# Patient Record
Sex: FEMALE | Race: WHITE | NOT HISPANIC OR LATINO | Employment: UNEMPLOYED | URBAN - METROPOLITAN AREA
[De-identification: names, ages, dates, MRNs, and addresses within clinical notes are randomized per-mention and may not be internally consistent; named-entity substitution may affect disease eponyms.]

---

## 2019-01-01 ENCOUNTER — APPOINTMENT (EMERGENCY)
Dept: RADIOLOGY | Facility: HOSPITAL | Age: 64
End: 2019-01-01
Payer: COMMERCIAL

## 2019-01-01 ENCOUNTER — HOSPITAL ENCOUNTER (EMERGENCY)
Facility: HOSPITAL | Age: 64
Discharge: HOME | End: 2019-01-14
Attending: EMERGENCY MEDICINE
Payer: COMMERCIAL

## 2019-01-01 ENCOUNTER — HOSPITAL ENCOUNTER (EMERGENCY)
Facility: HOSPITAL | Age: 64
End: 2019-01-29
Attending: EMERGENCY MEDICINE
Payer: COMMERCIAL

## 2019-01-01 VITALS
TEMPERATURE: 98.1 F | RESPIRATION RATE: 24 BRPM | HEART RATE: 96 BPM | DIASTOLIC BLOOD PRESSURE: 110 MMHG | OXYGEN SATURATION: 98 % | SYSTOLIC BLOOD PRESSURE: 164 MMHG | WEIGHT: 90.56 LBS

## 2019-01-01 DIAGNOSIS — I46.9 CARDIAC ARREST (HCC): Primary | ICD-10-CM

## 2019-01-01 DIAGNOSIS — J44.9 CHRONIC OBSTRUCTIVE PULMONARY DISEASE, UNSPECIFIED COPD TYPE (HCC): Primary | ICD-10-CM

## 2019-01-01 DIAGNOSIS — E87.6 HYPOKALEMIA: ICD-10-CM

## 2019-01-01 LAB
ANION GAP SERPL CALCULATED.3IONS-SCNC: 8 MMOL/L (ref 5–14)
ATRIAL RATE: 83 BPM
BACTERIA BLD CULT: NORMAL
BASOPHILS # BLD AUTO: 0.1 THOUSANDS/ΜL (ref 0–0.1)
BASOPHILS NFR BLD AUTO: 2 % (ref 0–1)
BUN SERPL-MCNC: 14 MG/DL (ref 5–25)
CA-I BLD-SCNC: 0.69 MMOL/L (ref 1.12–1.32)
CALCIUM SERPL-MCNC: 9.6 MG/DL (ref 8.4–10.2)
CHLORIDE SERPL-SCNC: 101 MMOL/L (ref 97–108)
CO2 SERPL-SCNC: 33 MMOL/L (ref 22–30)
CREAT SERPL-MCNC: 0.71 MG/DL (ref 0.6–1.2)
EOSINOPHIL # BLD AUTO: 0.1 THOUSAND/ΜL (ref 0–0.4)
EOSINOPHIL NFR BLD AUTO: 1 % (ref 0–6)
ERYTHROCYTE [DISTWIDTH] IN BLOOD BY AUTOMATED COUNT: 14.1 %
FLUAV + FLUBV RNA ISLT NAA+PROBE: NOT DETECTED
FLUAV + FLUBV RNA ISLT NAA+PROBE: NOT DETECTED
GFR SERPL CREATININE-BSD FRML MDRD: 104 ML/MIN/1.73SQ M
GLUCOSE SERPL-MCNC: 107 MG/DL (ref 70–99)
GLUCOSE SERPL-MCNC: 310 MG/DL (ref 65–140)
HCT VFR BLD AUTO: 48.6 % (ref 36–46)
HGB BLD-MCNC: 16 G/DL (ref 12–16)
LACTATE SERPL-SCNC: 1.2 MMOL/L (ref 0.7–2)
LYMPHOCYTES # BLD AUTO: 3.3 THOUSANDS/ΜL (ref 0.5–4)
LYMPHOCYTES NFR BLD AUTO: 39 % (ref 25–45)
MCH RBC QN AUTO: 29.9 PG (ref 26–34)
MCHC RBC AUTO-ENTMCNC: 32.9 G/DL (ref 31–36)
MCV RBC AUTO: 91 FL (ref 80–100)
MONOCYTES # BLD AUTO: 0.7 THOUSAND/ΜL (ref 0.2–0.9)
MONOCYTES NFR BLD AUTO: 8 % (ref 1–10)
NEUTROPHILS # BLD AUTO: 4.2 THOUSANDS/ΜL (ref 1.8–7.8)
NEUTS SEG NFR BLD AUTO: 50 % (ref 45–65)
P AXIS: 85 DEGREES
PLATELET # BLD AUTO: 286 THOUSANDS/UL (ref 150–450)
PMV BLD AUTO: 9.1 FL (ref 8.9–12.7)
PO2 BLD: 17 MM HG (ref 35–45)
POTASSIUM SERPL-SCNC: 2.9 MMOL/L (ref 3.6–5)
PR INTERVAL: 144 MS
QRS AXIS: 24 DEGREES
QRSD INTERVAL: 78 MS
QT INTERVAL: 330 MS
QTC INTERVAL: 387 MS
RBC # BLD AUTO: 5.36 MILLION/UL (ref 4–5.2)
SODIUM BLD-SCNC: >175 MMOL/L (ref 136–145)
SODIUM SERPL-SCNC: 142 MMOL/L (ref 137–147)
SPECIMEN SOURCE: ABNORMAL
T WAVE AXIS: 32 DEGREES
TROPONIN I SERPL-MCNC: 0.02 NG/ML (ref 0–0.03)
VENTRICULAR RATE: 83 BPM
WBC # BLD AUTO: 8.4 THOUSAND/UL (ref 4.5–11)

## 2019-01-01 PROCEDURE — 85025 COMPLETE CBC W/AUTO DIFF WBC: CPT | Performed by: EMERGENCY MEDICINE

## 2019-01-01 PROCEDURE — 96375 TX/PRO/DX INJ NEW DRUG ADDON: CPT

## 2019-01-01 PROCEDURE — 36415 COLL VENOUS BLD VENIPUNCTURE: CPT | Performed by: EMERGENCY MEDICINE

## 2019-01-01 PROCEDURE — 80048 BASIC METABOLIC PNL TOTAL CA: CPT | Performed by: EMERGENCY MEDICINE

## 2019-01-01 PROCEDURE — 82330 ASSAY OF CALCIUM: CPT

## 2019-01-01 PROCEDURE — 84132 ASSAY OF SERUM POTASSIUM: CPT

## 2019-01-01 PROCEDURE — 99284 EMERGENCY DEPT VISIT MOD MDM: CPT

## 2019-01-01 PROCEDURE — 99285 EMERGENCY DEPT VISIT HI MDM: CPT

## 2019-01-01 PROCEDURE — 96360 HYDRATION IV INFUSION INIT: CPT

## 2019-01-01 PROCEDURE — 93005 ELECTROCARDIOGRAM TRACING: CPT

## 2019-01-01 PROCEDURE — 85014 HEMATOCRIT: CPT

## 2019-01-01 PROCEDURE — 84295 ASSAY OF SERUM SODIUM: CPT

## 2019-01-01 PROCEDURE — 84484 ASSAY OF TROPONIN QUANT: CPT | Performed by: EMERGENCY MEDICINE

## 2019-01-01 PROCEDURE — 87040 BLOOD CULTURE FOR BACTERIA: CPT | Performed by: EMERGENCY MEDICINE

## 2019-01-01 PROCEDURE — 93010 ELECTROCARDIOGRAM REPORT: CPT | Performed by: INTERNAL MEDICINE

## 2019-01-01 PROCEDURE — 82947 ASSAY GLUCOSE BLOOD QUANT: CPT

## 2019-01-01 PROCEDURE — 96374 THER/PROPH/DIAG INJ IV PUSH: CPT

## 2019-01-01 PROCEDURE — 82803 BLOOD GASES ANY COMBINATION: CPT

## 2019-01-01 PROCEDURE — 71045 X-RAY EXAM CHEST 1 VIEW: CPT

## 2019-01-01 PROCEDURE — 83605 ASSAY OF LACTIC ACID: CPT | Performed by: EMERGENCY MEDICINE

## 2019-01-01 PROCEDURE — 87502 INFLUENZA DNA AMP PROBE: CPT | Performed by: EMERGENCY MEDICINE

## 2019-01-01 RX ORDER — POTASSIUM CHLORIDE 750 MG/1
40 TABLET, EXTENDED RELEASE ORAL ONCE
Status: COMPLETED | OUTPATIENT
Start: 2019-01-01 | End: 2019-01-01

## 2019-01-01 RX ORDER — AZITHROMYCIN 250 MG/1
TABLET, FILM COATED ORAL
Qty: 6 TABLET | Refills: 0 | Status: SHIPPED | OUTPATIENT
Start: 2019-01-01 | End: 2019-01-01

## 2019-01-01 RX ORDER — CALCIUM CHLORIDE 100 MG/ML
SYRINGE (ML) INTRAVENOUS CODE/TRAUMA/SEDATION MEDICATION
Status: COMPLETED | OUTPATIENT
Start: 2019-01-01 | End: 2019-01-01

## 2019-01-01 RX ORDER — ACETAMINOPHEN 325 MG/1
1000 TABLET ORAL ONCE
Status: COMPLETED | OUTPATIENT
Start: 2019-01-01 | End: 2019-01-01

## 2019-01-01 RX ORDER — ALBUTEROL SULFATE 90 UG/1
2 AEROSOL, METERED RESPIRATORY (INHALATION) EVERY 4 HOURS PRN
Qty: 1 INHALER | Refills: 0 | Status: SHIPPED | OUTPATIENT
Start: 2019-01-01

## 2019-01-01 RX ORDER — ALBUTEROL SULFATE 90 UG/1
2 AEROSOL, METERED RESPIRATORY (INHALATION) EVERY 4 HOURS PRN
Qty: 1 INHALER | Refills: 0 | Status: SHIPPED | OUTPATIENT
Start: 2019-01-01 | End: 2019-01-01

## 2019-01-01 RX ORDER — SODIUM BICARBONATE 84 MG/ML
INJECTION, SOLUTION INTRAVENOUS CODE/TRAUMA/SEDATION MEDICATION
Status: COMPLETED | OUTPATIENT
Start: 2019-01-01 | End: 2019-01-01

## 2019-01-01 RX ADMIN — SODIUM CHLORIDE 1000 ML: 9 INJECTION, SOLUTION INTRAVENOUS at 14:25

## 2019-01-01 RX ADMIN — ACETAMINOPHEN 975 MG: 325 TABLET ORAL at 13:52

## 2019-01-01 RX ADMIN — POTASSIUM CHLORIDE 40 MEQ: 10 TABLET, EXTENDED RELEASE ORAL at 15:04

## 2019-01-01 RX ADMIN — EPINEPHRINE 1 MG: 0.1 INJECTION INTRACARDIAC; INTRAVENOUS at 12:43

## 2019-01-01 RX ADMIN — CALCIUM CHLORIDE 1 G: 100 INJECTION, SOLUTION INTRAVENOUS at 12:45

## 2019-01-01 RX ADMIN — EPINEPHRINE 1 MG: 0.1 INJECTION INTRACARDIAC; INTRAVENOUS at 12:40

## 2019-01-01 RX ADMIN — SODIUM BICARBONATE 50 MEQ: 84 INJECTION, SOLUTION INTRAVENOUS at 12:42

## 2019-01-14 NOTE — ED PROCEDURE NOTE
PROCEDURE  ECG 12 Lead Documentation  Date/Time: 1/14/2019 1:34 PM  Performed by: Liza Fields  Authorized by: Liza Fields     Indications / Diagnosis:  Flu like illness  ECG reviewed by me, the ED Provider: yes    Patient location:  ED  Previous ECG:     Comparison to cardiac monitor: Yes    Interpretation:     Interpretation: normal    Rate:     ECG rate:  83    ECG rate assessment: normal    Rhythm:     Rhythm: sinus rhythm    Ectopy:     Ectopy: none    QRS:     QRS axis:  Normal    QRS intervals:  Normal  Conduction:     Conduction: normal    ST segments:     ST segments:  Normal  T waves:     T waves: normal           Katheryn Marie MD  01/14/19 5831

## 2019-01-14 NOTE — DISCHARGE INSTRUCTIONS
COPD (Chronic Obstructive Pulmonary Disease)   WHAT YOU NEED TO KNOW:   Chronic obstructive pulmonary disease (COPD) is a lung disease that makes it hard for you to breathe  It is usually a result of lung damage caused by years of irritation and inflammation in your lungs  DISCHARGE INSTRUCTIONS:   Call 911 if:   · You feel lightheaded, short of breath, and have chest pain  Return to the emergency department if:   · You are confused, dizzy, or feel faint  · Your arm or leg feels warm, tender, and painful  It may look swollen and red  · You cough up blood  Contact your healthcare provider if:   · You have more shortness of breath than usual      · You need more medicine than usual to control your symptoms  · You are coughing or wheezing more than usual      · You are coughing up more mucus, or it is a different color or has a different odor  · You gain more than 3 pounds in a week  · You have a fever, a runny or stuffy nose, and a sore throat, or other cold or flu symptoms  · Your skin, lips, or nails start to turn blue  · You have swelling in your legs or ankles  · You are very tired or weak for more than a day  · You notice changes in your mood, or changes in your ability to think or concentrate  · You have questions or concerns about your condition or care  Medicines:   · Medicines  may be used to open your airways, decrease swelling and inflammation in your lungs, or treat an infection  You may need 2 or more medicines  A short-acting medicine relieves symptoms quickly  Long-acting medicines will control or prevent symptoms  Ask for more information about the medicines you are given and how to use them safely  · Take your medicine as directed  Contact your healthcare provider if you think your medicine is not helping or if you have side effects  Tell him or her if you are allergic to any medicine  Keep a list of the medicines, vitamins, and herbs you take  Include the amounts, and when and why you take them  Bring the list or the pill bottles to follow-up visits  Carry your medicine list with you in case of an emergency  Help make breathing easier:   · Use pursed-lip breathing any time you feel short of breath  Take a deep breath in through your nose  Slowly breathe out through your mouth with your lips pursed for twice as long as you inhaled  You can also practice this breathing pattern while you bend, lift, climb stairs, or exercise  It slows down your breathing and helps move more air in and out of your lungs  · Do not smoke, and avoid others who smoke  Nicotine and other substances can cause lung irritation or damage and make it harder for you to breathe  Do not use e-cigarettes or smokeless tobacco  They still contain nicotine  Ask your healthcare provider for information if you currently smoke and need help to quit  For support and more information:  ¨ [employer]  Phone: 3- 581 - 962-0474  Web Address: [employer]      · Be aware of and avoid anything that makes your symptoms worse  Stay out of high altitudes and places with high humidity  Stay inside, or cover your mouth and nose with a scarf when you are outside during cold weather  Stay inside on days when air pollution or pollen counts are high  Do not use aerosol sprays such as deodorant, bug spray, and hair spray  Manage COPD and help prevent exacerbations:  COPD is a serious condition that gets worse over time  A COPD exacerbation means your symptoms suddenly get worse  It is important to prevent exacerbations  An exacerbation can cause more lung damage  COPD cannot be cured, but you can take action to feel better and prevent COPD exacerbations:  · Protect yourself from germs  Germs can get into your lungs and cause an infection  An infection in your lungs can create more mucus and make it harder to breathe   An infection can also create swelling in your airways and prevent air from getting in  You can decrease your risk for infection by doing the following:     [facility] [place] your hands often with soap and water  Carry germ-killing gel with you  You can use the gel to clean your hands when soap and water are not available  ¨ Do not touch your eyes, nose, or mouth unless you have washed your hands first      ¨ Always cover your mouth when you cough  Cough into a tissue or your shirtsleeve so you do not spread germs from your hands  ¨ Try to avoid people who have a cold or the flu  If you are sick, stay away from others as much as possible  · Drink more liquids  This will help to keep your air passages moist and help you cough up mucus  Ask how much liquid to drink each day and which liquids are best for you  · Exercise daily  Exercise for at least 20 minutes each day to help increase your energy and decrease shortness of breath  Walking or riding a bike are good ways to exercise  Talk to your healthcare provider about the best exercise plan for you  · Ask about vaccines  Your healthcare provider may recommend that you get regular flu and pneumonia vaccines  Pneumonia can become life-threatening for a person who has COPD  Ask about other vaccines you may need  Ask your healthcare provider about the flu and pneumonia vaccines  All adults should get the flu (influenza) vaccine every year as soon as it becomes available  The pneumonia vaccine is given to adults aged 72 or older to prevent pneumococcal disease, such as pneumonia  Adults aged 23 to 59 years who are at high risk for pneumococcal disease also should get the pneumococcal vaccine  It may need to be repeated 1 or 5 years later  Pulmonary rehabilitation:  Your healthcare provider may recommend a program to help you manage your symptoms and improve your quality of life  It may include nutritional counseling and exercise to strengthen your lungs     Make decisions about your choices for future treatment:  Ask for information about advanced medical directives and living celis  These documents help you decide and write down your choices for treatment and end-of-life care  It is best to complete them when you feel well and can think clearly about your wishes  The information can then be kept for future use if you are in the hospital or become very ill  Follow up with your healthcare provider as directed: You may need more tests  Your healthcare provider may refer you to a pulmonary (lung) specialist  Write down your questions so you remember to ask them during your visits  © 2017 [facility] [place] Information is for End User's use only and may not be sold, redistributed or otherwise used for commercial purposes  All illustrations and images included in CareNotes® are the copyrighted property of A D A M , Inc  or [employer]  The above information is an  only  It is not intended as medical advice for individual conditions or treatments  Talk to your doctor, nurse or pharmacist before following any medical regimen to see if it is safe and effective for you

## 2019-01-14 NOTE — ED PROVIDER NOTES
History  Chief Complaint   Patient presents with   Chinedu Grooms Like Symptoms     Patient is a 69-year-old female with history of asthma high blood pressure who presents with a 1 day history of flu-like symptoms  Patient states that she was run her grandson who was recently getting over the flu yesterday  Woke up today with nasal congestion sore throat positive productive yellow cough as well as chills and myalgias  Patient not taking medication for it  Does not have a regular doctor has not seen a regular doctor in over 3 years  Patient still smokes half a pack a day  Nothing makes better or worse  Did not get the flu shot this year  None       Past Medical History:   Diagnosis Date    Asthma     Hypertension        Past Surgical History:   Procedure Laterality Date    BACK SURGERY         History reviewed  No pertinent family history  I have reviewed and agree with the history as documented  Social History   Substance Use Topics    Smoking status: Current Every Day Smoker    Smokeless tobacco: Never Used    Alcohol use No        Review of Systems   Constitutional: Positive for chills  Negative for activity change, appetite change and fever  HENT: Positive for sore throat  Eyes: Negative  Respiratory: Positive for cough  Negative for shortness of breath  Cardiovascular: Negative  Negative for chest pain  Gastrointestinal: Negative  Negative for abdominal pain, diarrhea, nausea and vomiting  Endocrine: Negative  Genitourinary: Negative  Musculoskeletal: Positive for myalgias  Skin: Negative  Allergic/Immunologic: Negative  Neurological: Negative  Hematological: Negative  Psychiatric/Behavioral: Negative  All other systems reviewed and are negative  Physical Exam  Physical Exam   Constitutional: She is oriented to person, place, and time  She appears well-developed and well-nourished  Patient smells of tobacco    HENT:   Head: Normocephalic     Right Ear: External ear normal    Left Ear: External ear normal    Mouth/Throat: Oropharynx is clear and moist    Multiple missing teeth  Eyes: Pupils are equal, round, and reactive to light  Conjunctivae are normal    Neck: Normal range of motion  Neck supple  Cardiovascular: Normal rate, regular rhythm, normal heart sounds and intact distal pulses  Pulmonary/Chest: Effort normal and breath sounds normal  No respiratory distress  She has no wheezes  She has no rales  She exhibits no tenderness  Abdominal: Soft  Bowel sounds are normal    Musculoskeletal: Normal range of motion  Lymphadenopathy:     She has cervical adenopathy  Neurological: She is alert and oriented to person, place, and time  Skin: Skin is warm and dry  Capillary refill takes less than 2 seconds  Psychiatric: She has a normal mood and affect  Her behavior is normal    Nursing note and vitals reviewed        Vital Signs  ED Triage Vitals [01/14/19 1314]   Temperature Pulse Respirations Blood Pressure SpO2   98 1 °F (36 7 °C) 96 (!) 24 (!) 164/110 98 %      Temp Source Heart Rate Source Patient Position - Orthostatic VS BP Location FiO2 (%)   Tympanic Monitor Sitting Left arm --      Pain Score       7           Vitals:    01/14/19 1314   BP: (!) 164/110   Pulse: 96   Patient Position - Orthostatic VS: Sitting       Visual Acuity      ED Medications  Medications   acetaminophen (TYLENOL) tablet 975 mg (975 mg Oral Given 1/14/19 1352)   sodium chloride 0 9 % bolus 1,000 mL (0 mL Intravenous Stopped 1/14/19 1505)   potassium chloride (K-DUR,KLOR-CON) CR tablet 40 mEq (40 mEq Oral Given 1/14/19 1504)       Diagnostic Studies  Results Reviewed     Procedure Component Value Units Date/Time    Troponin I [[id number]]  (Normal) Collected:  01/14/19 1415    Lab Status:  Final result Specimen:  Blood from Arm, Right Updated:  01/14/19 1455     Troponin I 0 02 ng/mL     Basic metabolic panel [[id number]]  (Abnormal) Collected:  01/14/19 1415    Lab Status:  Final result Specimen:  Blood from Arm, Right Updated:  01/14/19 1443     Sodium 142 mmol/L      Potassium 2 9 (L) mmol/L      Chloride 101 mmol/L      CO2 33 (H) mmol/L      ANION GAP 8 mmol/L      BUN 14 mg/dL      Creatinine 0 71 mg/dL      Glucose 107 (H) mg/dL      Calcium 9 6 mg/dL      eGFR 104 ml/min/1 73sq m     Narrative:         National Kidney Disease Education Program recommendations are as follows:  GFR calculation is accurate only with a steady state creatinine  Chronic Kidney disease less than 60 ml/min/1 73 sq  meters  Kidney failure less than 15 ml/min/1 73 sq  meters  Lactic acid, plasma [[id number]]  (Normal) Collected:  01/14/19 1415    Lab Status:  Final result Specimen:  Blood from Arm, Right Updated:  01/14/19 1442     LACTIC ACID 1 2 mmol/L     Narrative:         Result may be elevated if tourniquet was used during collection  CBC and differential [[id number]]  (Abnormal) Collected:  01/14/19 1415    Lab Status:  Final result Specimen:  Blood from Arm, Right Updated:  01/14/19 1434     WBC 8 40 Thousand/uL      RBC 5 36 (H) Million/uL      Hemoglobin 16 0 g/dL      Hematocrit 48 6 (H) %      MCV 91 fL      MCH 29 9 pg      MCHC 32 9 g/dL      RDW 14 1 %      MPV 9 1 fL      Platelets 723 Thousands/uL      Neutrophils Relative 50 %      Lymphocytes Relative 39 %      Monocytes Relative 8 %      Eosinophils Relative 1 %      Basophils Relative 2 (H) %      Neutrophils Absolute 4 20 Thousands/µL      Lymphocytes Absolute 3 30 Thousands/µL      Monocytes Absolute 0 70 Thousand/µL      Eosinophils Absolute 0 10 Thousand/µL      Basophils Absolute 0 10 Thousands/µL     Blood culture [[id number]] Collected:  01/14/19 1415    Lab Status:   In process Specimen:  Blood from Arm, Right Updated:  01/14/19 1421    Rapid Flu-Viral RNA amplification [facility] HEART ONLY) [[id number]]  (Normal) Collected:  01/14/19 1345    Lab Status:  Final result Specimen:  Nares from Nose Updated:  01/14/19 8792 INFLUENZA A AMPLIFIED RNA Not Detected     INFLUENZA B AMPLIFIED Not Detected    Blood culture [[id number]]     Lab Status:  No result Specimen:  Blood                  XR chest portable   ED Interpretation by Ming Panda MD (01/14 0031)   Hyperinflation, no infiltration  Procedures  Procedures       Phone Contacts  ED Phone Contact    ED Course  ED Course as of Jan 14 1519   Mon Jan 14, 2019   1458 Patient feeling improved 1 over x-ray blood work results  Will discharge with antibiotics inhaler  Stressed follow up with [facility] family Practice return to the ER for any concerns  MDM  Number of Diagnoses or Management Options  Chronic obstructive pulmonary disease, unspecified COPD type ([facility] ): Hypokalemia:      Amount and/or Complexity of Data Reviewed  Clinical lab tests: ordered and reviewed  Tests in the radiology section of CPT®: ordered and reviewed  Tests in the medicine section of CPT®: reviewed and ordered  Independent visualization of images, tracings, or specimens: yes      CritCare Time    Disposition  Final diagnoses:   Chronic obstructive pulmonary disease, unspecified COPD type ([facility] )   Hypokalemia     Time reflects when diagnosis was documented in both MDM as applicable and the Disposition within this note     Time User Action Codes Description Comment    1/14/2019  2:59 PM Al Hein Add [J44 9] Chronic obstructive pulmonary disease, unspecified COPD type ([facility] )     1/14/2019  2:59 PM Al Hein Add [E87 6] Hypokalemia       ED Disposition     ED Disposition Condition Comment    Discharge  Raymon Hussein discharge to home/self care      Condition at discharge: Stable        Follow-up Information     Follow up With Specialties Details Why Dale   [facility]  [address]  [phone]            Discharge Medication List as of 1/14/2019  3:00 PM      START taking these medications    Details   albuterol (PROVENTIL HFA,VENTOLIN HFA) 90 mcg/act inhaler Inhale 2 puffs every 4 (four) hours as needed for wheezing, Starting Mon 1/14/2019, Print      azithromycin (ZITHROMAX) 250 mg tablet Take 2 tablets today then 1 tablet daily x 4 days, Print           No discharge procedures on file      ED Provider  Electronically Signed by           Christal Hester MD  01/14/19 7551

## 2019-01-14 NOTE — ED TRIAGE NOTES
When I woke up this morning, I have a sore throat, coughing, lumps on the side of my throat, tired, chills

## 2019-01-29 NOTE — ED PROVIDER NOTES
History  Chief Complaint   Patient presents with    Cardiac Arrest     cardiac arrest via EMS, suspected heroin o/d, pulseless/apneic on arrival      65-year-old female presents for pre-hospital cardiac arrest   History is provided by EMS who were called to the scene after family had not seen the patient for 20 minutes went to check on her she was not breathing had no pulse  Shortly after family initiated CPR  Upon EMS arrival the patient was in PA arrest   ACLS protocol was continued  The patient received 4 doses of epi pre-hospital   Down time was upwards of 20 minutes prior to chest compression initiation an additional 20 to 30 minutes until arrival to the hospital   Upon arrival patient was intubated have bilateral breath sounds pupils were for rule mm and unreactive bilaterally  No external signs of trauma  The patient had an IO in the left lower extremity  Patient was cool to touch  Basal as protocol was continued with additional epinephrine, bicarb and calcium  On 2 subsequent pulse checks there is no cardiac activity on bedside ultrasound, PEA on monitor, no pulse and finally code was called  None       No past medical history on file  No past surgical history on file  No family history on file  I have reviewed and agree with the history as documented  Social History   Substance Use Topics    Smoking status: Not on file    Smokeless tobacco: Not on file    Alcohol use Not on file        Review of Systems   Unable to perform ROS: Patient unresponsive       Physical Exam  ED Triage Vitals [01/29/19 1242]   Temp Pulse Resp BP SpO2   -- (!) 0 -- -- --      Temp src Heart Rate Source Patient Position - Orthostatic VS BP Location FiO2 (%)   -- -- -- -- --      Pain Score       --           Orthostatic Vital Signs  Vitals:    01/29/19 1242   Pulse: (!) 0       Physical Exam   Constitutional: She appears toxic  She is intubated  HENT:   Head: Normocephalic and atraumatic   Head is without raccoon's eyes, without Casey's sign, without abrasion and without contusion  Nose: No epistaxis  Intubated   Eyes:   Pupils fixed and dilated  Not reactive to light  Neck: No JVD present  Cardiovascular:   No palpable pulse   Pulmonary/Chest: Breath sounds normal  She is intubated  Equal breath sounds bilaterally   Abdominal: Soft  She exhibits no distension  Neurological: She is unresponsive  GCS eye subscore is 1  GCS verbal subscore is 1  GCS motor subscore is 1  GCS 3 T  Pupils fixed and dilated  Patient cool to touch  Skin: Skin is intact  Right arm track marks   Psychiatric: She has a normal mood and affect  Nursing note and vitals reviewed  ED Medications  Medications   EPINEPHrine (ADRENALIN) injection (1 mg Intravenous Given 1/29/19 1243)   sodium bicarbonate 50 mEq/50 mL injection (50 mEq Intravenous Given 1/29/19 1242)   calcium chloride 1 g/10 mL injection (1 g Intravenous Given 1/29/19 1245)       Diagnostic Studies  Results Reviewed     Procedure Component Value Units Date/Time    POCT Blood Gas (CG8+) [[id number]]  (Abnormal) Collected:  01/29/19 1249    Lab Status:  Final result Updated:  01/29/19 1439     ph, Charles ISTAT --     pCO2, Charles i-STAT -- mm HG      pO2, Charles i-STAT 17 0 (L) mm HG      BE, i-STAT -- mmol/L      HCO3, Charles i-STAT -- mmol/L      CO2, i-STAT -- mmol/L      O2 Sat, i-STAT -- %      SODIUM, I-STAT >175 (HH) mmol/l      Potassium, i-STAT -- mmol/L      Calcium, Ionized i-STAT 0 69 (LL) mmol/L      Hct, i-STAT -- %      Hgb, i-STAT -- g/dl      Glucose, i-STAT 310 (H) mg/dl      Specimen Type VENOUS                 No orders to display         Procedures  Procedures      Phone Consults  ED Phone Contact    ED Course  ED Course as of Jan 29 1926   Tue Jan 29, 2019   1356  at bedside  64 Daughter at bedside reports that the mother accidentally overdosed on heroin while they are all in the same house  MDM    Disposition  Final diagnoses:   Cardiac arrest [facility])     Time reflects when diagnosis was documented in both MDM as applicable and the Disposition within this note     Time User Action Codes Description Comment    2019  1:28 PM Teri Quinn [I46 9] Cardiac arrest [facility])       ED Disposition     ED Disposition Condition Date/Time Comment        1:13 PM       Follow-up Information    None       Date, Time and Cause of Death    Date of Death:  19  Time of Death:  12:47 PM  Preliminary Cause of Death:  Cardiac arrest ([facility] )  Entered by:  Salome Lowe[BB1 1]     Attribution     BB1  [id number] [address] Selvin Guevara DO 19 15:10        There are no discharge medications for this patient  No discharge procedures on file  ED Provider  Attending physically available and evaluated Dennis Huerta I managed the patient along with the ED Attending      Electronically Signed by         Snehal Barros DO  19 0346

## 2019-01-29 NOTE — ED ATTENDING ATTESTATION
Shawnee Chavis MD, saw and evaluated the patient  I have discussed the patient with the resident/non-physician practitioner and agree with the resident's/non-physician practitioner's findings, Plan of Care, and MDM as documented in the resident's/non-physician practitioner's note, except where noted  All available labs and Radiology studies were reviewed  At this point I agree with the current assessment done in the Emergency Department  I have conducted an independent evaluation of this patient a history and physical is as follows:patient with suspected overdose at home  Was seen by family 20 minutes prior to being found unresponsive and pulseless  EMS estimates 20 minute downtime prior to their arrival  Patient was pulseless and in respiratory arrest upon their arrival   PEA noted throughout EMS care  No ROSC  Intubated prehospital  Left IO placed by EMS  Estimated EMS time prior to arrival is 20 minutes  Patient had 4 rounds of epinephrine without ROSC  Continued CPR by po  Upon arrival patient is pulseless in PEA  Breath sounds confirmed b/l multiple times  Right femoral TLC placed by resident  3 rounds of epinephrine in ED  Received bicarb and calcium as well  Cardiac ultrasound shows no wall movement  Patient remained PEA  No pulses  Total resuscitation time estimated at 35 minutes with no ROSC        Critical Care Time  Procedures

## 2019-01-29 NOTE — ED PROCEDURE NOTE
Procedure  Central Line  Date/Time: 1/29/2019 12:54 PM  Performed by: Roosevelt Ruiz by: Janeth Ruvalcaba     Patient location:  ED  Other Assisting Provider: Yes (comment)    Consent:     Consent obtained:  Emergent situation  Universal protocol:     Patient identity confirmed: Anonymous protocol, patient vented/unresponsive  Pre-procedure details:     Hand hygiene: Hand hygiene performed prior to insertion      Skin preparation:  2% chlorhexidine    Skin preparation agent: Skin preparation agent completely dried prior to procedure    Indications:     Central line indications: no peripheral vascular access    Anesthesia (see MAR for exact dosages): Anesthesia method:  None  Procedure details:     Location:  Right femoral    Vessel type: vein      Laterality:  Right    Approach: open technique used      Patient position:  Flat    Catheter type:  Triple lumen 20cm    Catheter size:  7 Fr    Landmarks identified: yes      Ultrasound guidance: no      Number of attempts:  1    Successful placement: yes    Post-procedure details:     Post-procedure:  Line sutured    Assessment:  Blood return through all ports and free fluid flow    Post-procedure complications: none      Patient tolerance of procedure:   Tolerated well, no immediate complications                     Dhruv Decker DO  01/29/19 2189